# Patient Record
Sex: FEMALE | Race: WHITE | NOT HISPANIC OR LATINO
[De-identification: names, ages, dates, MRNs, and addresses within clinical notes are randomized per-mention and may not be internally consistent; named-entity substitution may affect disease eponyms.]

---

## 2021-04-19 PROBLEM — Z00.00 ENCOUNTER FOR PREVENTIVE HEALTH EXAMINATION: Status: ACTIVE | Noted: 2021-04-19

## 2021-04-23 ENCOUNTER — NON-APPOINTMENT (OUTPATIENT)
Age: 26
End: 2021-04-23

## 2021-06-21 ENCOUNTER — NON-APPOINTMENT (OUTPATIENT)
Age: 26
End: 2021-06-21

## 2021-06-22 ENCOUNTER — APPOINTMENT (OUTPATIENT)
Dept: SURGERY | Facility: CLINIC | Age: 26
End: 2021-06-22
Payer: OTHER GOVERNMENT

## 2021-06-22 VITALS — BODY MASS INDEX: 24.59 KG/M2 | WEIGHT: 144 LBS | HEIGHT: 64 IN

## 2021-06-22 PROCEDURE — 99203 OFFICE O/P NEW LOW 30 MIN: CPT

## 2021-06-22 NOTE — ASSESSMENT
[FreeTextEntry1] : Shannen is a pleasant 26-year-old woman who recently moved to [place] presenting to the office with her mother complaining of pain and swelling in the right groin which began recently after her move to [place]. I performed umbilical hernia surgery on her father 2 years ago which is why she return to [place] to see me for her issue. She does suffer from chronic constipation.\par \par Physical examination demonstrates a tender grape-sized bulge in the right groin which is reducible with minimal to moderate difficulty consistent with a symptomatic protruding right inguinal hernia warranting surgical repair. There is no evidence of incarceration or strangulation, and the patient denies any symptoms of obstruction.  Examination of her left groin is unremarkable. Her umbilical examination is also unremarkable. Her current BMI is 25.\par \par I explained the pros and cons of surgery, as well as all risks, benefits, indications and alternatives of the procedure and the patient understood and agreed. Shannen was scheduled for the repair of her right inguinal hernia with mesh on Friday, July 2, 2021 under LOCAL with IV SEDATION at [facility] with presurgical testing waived.  She was encouraged to avoid heavy lifting and strenuous activity in the interim, of course.

## 2021-06-22 NOTE — PHYSICAL EXAM
[JVD] : no jugular venous distention  [Normal Breath Sounds] : Normal breath sounds [No Rash or Lesion] : No rash or lesion [Alert] : alert [Calm] : calm [de-identified] : healthy [de-identified] : normal [de-identified] : soft and flat abdomen\par  [de-identified] : right inguinal hernia [de-identified] : right inguinal hernia, reducible

## 2021-06-29 ENCOUNTER — OUTPATIENT (OUTPATIENT)
Dept: OUTPATIENT SERVICES | Facility: HOSPITAL | Age: 26
LOS: 1 days | Discharge: HOME | End: 2021-06-29

## 2021-06-29 DIAGNOSIS — Z11.59 ENCOUNTER FOR SCREENING FOR OTHER VIRAL DISEASES: ICD-10-CM

## 2021-07-02 ENCOUNTER — OUTPATIENT (OUTPATIENT)
Dept: OUTPATIENT SERVICES | Facility: HOSPITAL | Age: 26
LOS: 1 days | Discharge: HOME | End: 2021-07-02

## 2021-07-02 ENCOUNTER — APPOINTMENT (OUTPATIENT)
Dept: SURGERY | Facility: AMBULATORY SURGERY CENTER | Age: 26
End: 2021-07-02
Payer: OTHER GOVERNMENT

## 2021-07-02 VITALS
TEMPERATURE: 98 F | RESPIRATION RATE: 18 BRPM | SYSTOLIC BLOOD PRESSURE: 107 MMHG | HEIGHT: 64 IN | OXYGEN SATURATION: 100 % | HEART RATE: 61 BPM | WEIGHT: 143.96 LBS | DIASTOLIC BLOOD PRESSURE: 60 MMHG

## 2021-07-02 VITALS
SYSTOLIC BLOOD PRESSURE: 100 MMHG | OXYGEN SATURATION: 100 % | RESPIRATION RATE: 14 BRPM | HEART RATE: 58 BPM | DIASTOLIC BLOOD PRESSURE: 59 MMHG

## 2021-07-02 DIAGNOSIS — K08.199 COMPLETE LOSS OF TEETH DUE TO OTHER SPECIFIED CAUSE, UNSPECIFIED CLASS: Chronic | ICD-10-CM

## 2021-07-02 PROCEDURE — 49505 PRP I/HERN INIT REDUC >5 YR: CPT | Mod: RT

## 2021-07-02 RX ORDER — OXYCODONE AND ACETAMINOPHEN 5; 325 MG/1; MG/1
1 TABLET ORAL EVERY 4 HOURS
Refills: 0 | Status: DISCONTINUED | OUTPATIENT
Start: 2021-07-02 | End: 2021-07-02

## 2021-07-02 RX ORDER — SODIUM CHLORIDE 9 MG/ML
1000 INJECTION, SOLUTION INTRAVENOUS
Refills: 0 | Status: DISCONTINUED | OUTPATIENT
Start: 2021-07-02 | End: 2021-07-16

## 2021-07-02 RX ORDER — HYDROMORPHONE HYDROCHLORIDE 2 MG/ML
0.5 INJECTION INTRAMUSCULAR; INTRAVENOUS; SUBCUTANEOUS
Refills: 0 | Status: DISCONTINUED | OUTPATIENT
Start: 2021-07-02 | End: 2021-07-02

## 2021-07-02 RX ORDER — TRAMADOL HYDROCHLORIDE 50 MG/1
1 TABLET ORAL
Qty: 30 | Refills: 0
Start: 2021-07-02 | End: 2021-07-06

## 2021-07-02 RX ORDER — APREPITANT 80 MG/1
40 CAPSULE ORAL ONCE
Refills: 0 | Status: COMPLETED | OUTPATIENT
Start: 2021-07-02 | End: 2021-07-02

## 2021-07-02 RX ORDER — ONDANSETRON 8 MG/1
4 TABLET, FILM COATED ORAL ONCE
Refills: 0 | Status: DISCONTINUED | OUTPATIENT
Start: 2021-07-02 | End: 2021-07-16

## 2021-07-02 RX ADMIN — APREPITANT 40 MILLIGRAM(S): 80 CAPSULE ORAL at 11:42

## 2021-07-02 NOTE — ASU DISCHARGE PLAN (ADULT/PEDIATRIC) - CARE PROVIDER_API CALL
Murali Al)  Surgery  [address]  [place] [address]  Phone: [phone]  Fax: [phone]  Scheduled Appointment: 07/13/2021

## 2021-07-07 DIAGNOSIS — K40.90 UNILATERAL INGUINAL HERNIA, WITHOUT OBSTRUCTION OR GANGRENE, NOT SPECIFIED AS RECURRENT: ICD-10-CM

## 2021-07-13 ENCOUNTER — APPOINTMENT (OUTPATIENT)
Dept: SURGERY | Facility: CLINIC | Age: 26
End: 2021-07-13
Payer: OTHER GOVERNMENT

## 2021-07-13 DIAGNOSIS — K40.90 UNILATERAL INGUINAL HERNIA, W/OUT OBSTRUCTION OR GANGRENE, NOT SPECIFIED AS RECURRENT: ICD-10-CM

## 2021-07-13 PROCEDURE — 99024 POSTOP FOLLOW-UP VISIT: CPT

## 2021-07-13 NOTE — ASSESSMENT
[FreeTextEntry1] : Shannen underwent the repair of her large indirect right inguinal hernia with mesh on July 2, 2021 under local with IV sedation without any problems or complications. Her wound is clean, dry and intact. There is no evidence of erythema, seroma formation or infection. She is tolerating a diet and having normal bowel movements. She denies any significant postoperative pain or discomfort at this time.\par \par She and her mother were counseled and reassured. Shannen was discharged from the office with no specific followup necessary, but she knows to avoid any heavy lifting or strenuous activity for the next several weeks. She is planning on returning to [place] next week and was encouraged to avoid carrying any heavy suitcases or boxes on this trip.

## 2024-03-01 NOTE — ASU PREOP CHECKLIST - NS PREOP CHK MONITOR ANESTHESIA CONSENT
11/1/2023 TSH modestly low at 0.332, free T4 normal at 1.01, previous 6/3/2022 with TSH normal at 0.82 and free T4 normal 0.85.  Recheck on 1/2/2024 TSH normalized to 0.19 and free T4 normal at 0.93.  As such this appeared to be a transient process, she can sometimes occur such as to post viral syndrome pattern.  No treatment necessary.  Monitor yearly.  Patient is notably without any hypothyroid or hyperthyroid type symptoms.   
BMI in the low 40s range but she has actually had 27 pound weight loss since 7/15/2022 when she was 278 pounds, since then she has done well with continued weight loss although slight increase up to 229 pounds after low of 222 pounds January 2024.  We had discussed possible phentermine therapy before, but with ongoing anxiety pattern that would probably benefit to hold off in that regard but we could reconsider in future as her mood is better.  Reinforced importance of ongoing healthy diet, exercise and continued weight loss.     
Long-standing tendency for many years, for which she transitionally tried citalopram in a postpartum scenario by her obstetrician 
Modest pattern of sleep difficulty which hydroxyzine added for anxiety more so than depression can give further benefit in that regard.  We also discussed Remeron as an option in the future although with her breast feeding it is not recommended to use at this time.  If she were to stop breast-feeding we could consider that as well.  Continue good sleep hygiene.  Advised if not improving.  
Overall good response to medication for allergies seasonally, but with use of hydroxyzine for anxiety benefit we will hold Zyrtec currently, continue Flonase.  We could additionally add Singulair in future for any breakthrough symptoms.  Additional benefit of saline spray, nasal flushing.  Advise concerns.  
done